# Patient Record
Sex: MALE | Employment: UNEMPLOYED | URBAN - METROPOLITAN AREA
[De-identification: names, ages, dates, MRNs, and addresses within clinical notes are randomized per-mention and may not be internally consistent; named-entity substitution may affect disease eponyms.]

---

## 2024-10-23 ENCOUNTER — HOSPITAL ENCOUNTER (OUTPATIENT)
Dept: GENERAL RADIOLOGY | Age: 39
Discharge: HOME | End: 2024-10-23
Payer: MEDICARE

## 2024-10-23 ENCOUNTER — HOSPITAL ENCOUNTER (OUTPATIENT)
Dept: CT IMAGING | Age: 39
Discharge: HOME | End: 2024-10-23
Payer: MEDICARE

## 2024-10-23 DIAGNOSIS — R13.10 DYSPHAGIA, UNSPECIFIED TYPE: ICD-10-CM

## 2024-10-23 DIAGNOSIS — F50.9 EATING DISORDER, UNSPECIFIED TYPE: ICD-10-CM

## 2024-10-23 DIAGNOSIS — R63.4 ABNORMAL WEIGHT LOSS: ICD-10-CM

## 2024-10-23 PROCEDURE — 92611 MOTION FLUOROSCOPY/SWALLOW: CPT

## 2024-10-23 PROCEDURE — 74176 CT ABD & PELVIS W/O CONTRAST: CPT

## 2024-10-23 PROCEDURE — 74230 X-RAY XM SWLNG FUNCJ C+: CPT

## 2024-10-23 NOTE — PROCEDURES
INSTRUMENTAL SWALLOW REPORT  MODIFIED BARIUM SWALLOW    NAME: Noam Martinez     : 1985  MRN: [id number]       Date of Eval: 10/23/2024     Ordering Physician: Dr. Jane Ferreira  Referring Diagnosis: Dysphagia    Past Medical History:  has no past medical history on file.  Past Surgical History:  has no past surgical history on file.    CXR: NA    Prior MBS Results: NA    Patient Complaints/Reason for Referral:  Noam Martinez was referred for a MBS to assess the efficiency of his/her swallow function, assess for aspiration, and to make recommendations regarding safe dietary consistencies, effective compensatory strategies, and safe eating environment.    Onset of problem:   10/23/2024    Pain   None indicated by any means    Subjective:  Awake, alert, pleasant. Accompanied by his caregiver.     Impressions:  Oral Phase  Grossly WFL. Slowed but adequate mastication of regular texture solid. Demonstrated appropriate oral containment of all solid and liquid consistencies, however he intermittently spontaneously expectorated some of the trials.  Pharyngeal Phase  Grossly WFL; adequate bolus control, timely swallow movement, appropriate hyolaryngeal mechanics, pharyngeal clearance. No aspiration, no penetration, minimal stasis.    Treatment Dx and ICD 10: dysphagia  Position: Lateral and upright  Consistencies administered: thins via tsp/cup/straw, puree, regular texture solid.    Penetration Aspiration Scale (PAS)  [x] 1 Material does not enter the airway    Dysphagia Outcome Severity Scale  [x] Level 6 - Within functional limits/Modified independence    Recommendations/Treatment  Consider follow-up with GI and dietician d/t ongoing concerns re: patient expectorating his food.     Recommended Exercises:   NA    Education: Images and recommendations were reviewed with the patient following this exam. Patient with doubtful comprehension. Caregiver verbalized understanding.    Goals:

## 2025-05-19 ENCOUNTER — HOSPITAL ENCOUNTER (EMERGENCY)
Age: 40
Discharge: HOME | End: 2025-05-20
Attending: EMERGENCY MEDICINE
Payer: MEDICARE

## 2025-05-19 ENCOUNTER — APPOINTMENT (OUTPATIENT)
Dept: GENERAL RADIOLOGY | Age: 40
End: 2025-05-19
Payer: MEDICARE

## 2025-05-19 VITALS
RESPIRATION RATE: 18 BRPM | OXYGEN SATURATION: 97 % | SYSTOLIC BLOOD PRESSURE: 118 MMHG | HEART RATE: 61 BPM | WEIGHT: 152.6 LBS | DIASTOLIC BLOOD PRESSURE: 66 MMHG | TEMPERATURE: 97.7 F

## 2025-05-19 DIAGNOSIS — T17.308A CHOKING, INITIAL ENCOUNTER: Primary | ICD-10-CM

## 2025-05-19 PROCEDURE — 71046 X-RAY EXAM CHEST 2 VIEWS: CPT

## 2025-05-19 PROCEDURE — 99283 EMERGENCY DEPT VISIT LOW MDM: CPT

## 2025-05-20 NOTE — DISCHARGE INSTRUCTIONS
Thank you for choosing [facility] for your emergency care today. We take a lot of pride in the fact that you chose us for your care and we strived to do everything necessary to provide you with excellent medical care. We hope you agree that you received excellent care today.    To continue that excellent medical care, the following information very important that you read and understand before you leave the emergency department today. It contains information about:  Your diagnosis  What was done for you in the emergency department  What you can expect from your illness or injury moving forward  The steps you will need to take after leaving the emergency department to help achieve the best possible outcome for your illness or injury.    What we did in the Emergency Department Today:     You were seen in the Emergency Department today by Naeem Bright PA-C.     You had the following lab abnormalities:  Labs Reviewed - No data to display    If no result appears for the test, then it was within normal limits. If the test is pending, the hospital will nisreen you if the results are abnormal as soon as the test is completed.    You had the following diagnostic imaging:  XR CHEST (2 VW)   Final Result      No acute chest process.      Electronically signed by Herberth Feliz MD          You were given the following medications during your stay in the Emergency Department:  No orders of the defined types were placed in this encounter.      You were diagnosed with the followin. Choking, initial encounter        IMPORTANT: If your condition worsens, or your development symptoms that you find concerning and want to have checked out immediately, do not hesitate to return to the Emergency Department. You can call --1 and have trained Emergency Medical Service providers bring you to the emergency department if you can't do so safely and quickly. They can begin the medical evaluation,

## 2025-05-20 NOTE — ED PROVIDER NOTES
ED Attending Attestation Note     Date of evaluation: 5/19/2025    This patient was seen by the advance practice provider.  I have seen and examined the patient, agree with the workup, evaluation, management and diagnosis. The care plan has been discussed.  My assessment reveals a 39-year-old male who presents with a chief complaint of choking.  Patient had a choking/aspiration event.  Group home sent him to be checked out.    General: This is a chronically ill appearing male  in no acute distress who appears their stated age.     HEENT: NC/AT. PERRL. OP clear. MMM.     Neck: Supple. Trachea midline.    Pulmonary: Normal work of breathing, not using accessory muscles or pursed lip breathing    Cardiac: RRR    Abdomen: S/NT/ND/+BS. No cva tenderness.     Musculoskeletal: WWP with no clubbing, cyanosis, or deformities noted.     Vascular: +2 radial and DP pulses.     Skin: Warm and dry with no lesions noted    Neuro:  AAOx4.  Face is grossly symmetric.  Gait not assessed. Moving all 4 extremities equally and spontaneously       Lavinia Putnam MD  05/20/25 0025

## 2025-05-29 ENCOUNTER — TRANSCRIBE ORDERS (OUTPATIENT)
Dept: ADMINISTRATIVE | Age: 40
End: 2025-05-29

## 2025-05-29 DIAGNOSIS — R09.89 CHOKING EPISODE: Primary | ICD-10-CM

## 2025-05-29 DIAGNOSIS — L21.9 SEBORRHEIC DERMATITIS: ICD-10-CM

## 2025-05-29 DIAGNOSIS — R63.4 ABNORMAL WEIGHT LOSS: ICD-10-CM

## 2025-06-02 ENCOUNTER — HOSPITAL ENCOUNTER (OUTPATIENT)
Dept: GENERAL RADIOLOGY | Age: 40
Discharge: HOME | End: 2025-06-02
Payer: MEDICARE

## 2025-06-02 ENCOUNTER — HOSPITAL ENCOUNTER (OUTPATIENT)
Dept: SPEECH THERAPY | Age: 40
Setting detail: THERAPIES SERIES
Discharge: HOME | End: 2025-06-02
Payer: MEDICARE

## 2025-06-02 DIAGNOSIS — R09.89 CHOKING EPISODE: ICD-10-CM

## 2025-06-02 DIAGNOSIS — L21.9 SEBORRHEIC DERMATITIS: ICD-10-CM

## 2025-06-02 DIAGNOSIS — R63.4 ABNORMAL WEIGHT LOSS: ICD-10-CM

## 2025-06-02 PROCEDURE — 74230 X-RAY XM SWLNG FUNCJ C+: CPT

## 2025-06-02 PROCEDURE — 92611 MOTION FLUOROSCOPY/SWALLOW: CPT

## 2025-06-02 NOTE — PROCEDURES
INSTRUMENTAL SWALLOW REPORT  Repeat Outpatient MODIFIED BARIUM SWALLOW  Discharge     NAME: Noam Martinez     : 1985  MRN: [id number]       Date of Eval: 2025     Ordering Physician: Hanna  Referring Diagnosis: Dysphagia    Past Medical History:  has no past medical history on file.  Past Surgical History:  has no past surgical history on file.    CXR 25- most recent  No acute chest process.     Prior MBS Results 10/23/24  Oral Phase  Grossly WFL. Slowed but adequate mastication of regular texture solid. Demonstrated appropriate oral containment of all solid and liquid consistencies, however he intermittently spontaneously expectorated some of the trials.  Pharyngeal Phase  Grossly WFL; adequate bolus control, timely swallow movement, appropriate hyolaryngeal mechanics, pharyngeal clearance. No aspiration, no penetration, minimal stasis.  Recommend regular diet with thin liquids with supervision with all meals     Patient Complaints/Reason for Referral:  Noam Martinez was referred for a MBS to assess the efficiency of his/her swallow function, assess for aspiration, and to make recommendations regarding safe dietary consistencies, effective compensatory strategies, and safe eating environment.    Onset of problem:       Behavior/Cognition:alert and cooperative   Vision/Hearing:functional for purpose of testing       Impressions:   Pt referred for Modified Barium Swallowing following choking episode on 25  Oral Phase  Pt presents with mild oral phase dysphagia characterized by prolonged mastication with solids. Pt noted to be impulsive when consuming successive swallows of thin barium when instructed to take a small sip. Pt had no difficulty drawing liquid up straw of closing lips around the spoon.     Pharyngeal Phase  Essentially WFL. Pt with mildly impaired pharyngeal contraction which resulted in a mild amount of residue to remain in the valleculae after the swallow

## 2025-06-12 ASSESSMENT — ENCOUNTER SYMPTOMS
COLOR CHANGE: 0
DIARRHEA: 0
ABDOMINAL DISTENTION: 0
EYE PAIN: 0
SHORTNESS OF BREATH: 0
VOMITING: 0
ABDOMINAL PAIN: 0
NAUSEA: 0
RHINORRHEA: 0
COUGH: 0
WHEEZING: 0
SORE THROAT: 0
TROUBLE SWALLOWING: 0
CHEST TIGHTNESS: 0